# Patient Record
Sex: FEMALE | Race: WHITE | URBAN - METROPOLITAN AREA
[De-identification: names, ages, dates, MRNs, and addresses within clinical notes are randomized per-mention and may not be internally consistent; named-entity substitution may affect disease eponyms.]

---

## 2020-10-22 ENCOUNTER — OFFICE VISIT (OUTPATIENT)
Dept: URBAN - METROPOLITAN AREA CLINIC 29 | Facility: CLINIC | Age: 50
End: 2020-10-22
Payer: COMMERCIAL

## 2020-10-22 DIAGNOSIS — H52.13 MYOPIA, BILATERAL: Primary | ICD-10-CM

## 2020-10-22 PROCEDURE — 92002 INTRM OPH EXAM NEW PATIENT: CPT | Performed by: OPTOMETRIST

## 2020-10-22 ASSESSMENT — VISUAL ACUITY
OD: 20/20
OS: 20/20

## 2020-10-22 ASSESSMENT — INTRAOCULAR PRESSURE
OS: 12
OD: 12

## 2023-03-02 ENCOUNTER — OFFICE VISIT (OUTPATIENT)
Dept: URBAN - METROPOLITAN AREA CLINIC 28 | Facility: CLINIC | Age: 53
End: 2023-03-02

## 2023-03-02 DIAGNOSIS — H52.13 MYOPIA, BILATERAL: Primary | ICD-10-CM

## 2023-03-02 PROCEDURE — 92012 INTRM OPH EXAM EST PATIENT: CPT | Performed by: OPTOMETRIST

## 2023-03-02 ASSESSMENT — VISUAL ACUITY
OD: 20/20
OS: 20/20

## 2023-03-02 ASSESSMENT — KERATOMETRY
OD: 40.50
OS: 41.25

## 2023-05-11 ENCOUNTER — OFFICE VISIT (OUTPATIENT)
Dept: URBAN - METROPOLITAN AREA CLINIC 27 | Facility: CLINIC | Age: 53
End: 2023-05-11
Payer: COMMERCIAL

## 2023-05-11 DIAGNOSIS — H44.23 DEGENERATIVE MYOPIA, BILATERAL: ICD-10-CM

## 2023-05-11 DIAGNOSIS — H53.10 SUBJECTIVE VISUAL DISTURBANCE: Primary | ICD-10-CM

## 2023-05-11 PROCEDURE — 92134 CPTRZ OPH DX IMG PST SGM RTA: CPT | Performed by: OPHTHALMOLOGY

## 2023-05-11 PROCEDURE — 99204 OFFICE O/P NEW MOD 45 MIN: CPT | Performed by: OPHTHALMOLOGY

## 2023-05-11 ASSESSMENT — INTRAOCULAR PRESSURE
OS: 11
OD: 11

## 2023-05-11 NOTE — IMPRESSION/PLAN
Impression: Subjective visual disturbance: H53.10. Plan: Patient woke up with peripheral vision symptoms. Symptoms consistent with ocular migraine. No evidence of PVD; no evidence of PIC/inflammation. No evidence of optic neuritis. Reassurance provided. Warning symptoms discussed. 

RTC RCA PRN

## 2024-12-19 ENCOUNTER — OFFICE VISIT (OUTPATIENT)
Dept: URBAN - METROPOLITAN AREA CLINIC 28 | Facility: CLINIC | Age: 54
End: 2024-12-19
Payer: COMMERCIAL

## 2024-12-19 DIAGNOSIS — H35.3111 NEXDTVE AGE-RELATED MCLR DEGN, RIGHT EYE, EARLY DRY STAGE: Primary | ICD-10-CM

## 2024-12-19 DIAGNOSIS — H52.13 MYOPIA, BILATERAL: ICD-10-CM

## 2024-12-19 PROCEDURE — 92014 COMPRE OPH EXAM EST PT 1/>: CPT | Performed by: OPTOMETRIST

## 2024-12-19 ASSESSMENT — INTRAOCULAR PRESSURE
OS: 12
OD: 12

## 2024-12-19 ASSESSMENT — VISUAL ACUITY
OD: 20/25
OS: 20/20